# Patient Record
Sex: MALE | Race: WHITE | NOT HISPANIC OR LATINO | Employment: OTHER | URBAN - NONMETROPOLITAN AREA
[De-identification: names, ages, dates, MRNs, and addresses within clinical notes are randomized per-mention and may not be internally consistent; named-entity substitution may affect disease eponyms.]

---

## 2017-02-13 ENCOUNTER — OFFICE VISIT (OUTPATIENT)
Dept: CARDIOLOGY | Facility: CLINIC | Age: 82
End: 2017-02-13

## 2017-02-13 VITALS
BODY MASS INDEX: 35.31 KG/M2 | DIASTOLIC BLOOD PRESSURE: 62 MMHG | HEART RATE: 84 BPM | SYSTOLIC BLOOD PRESSURE: 112 MMHG | WEIGHT: 233 LBS | HEIGHT: 68 IN

## 2017-02-13 DIAGNOSIS — I10 ESSENTIAL HYPERTENSION: Primary | ICD-10-CM

## 2017-02-13 DIAGNOSIS — E78.00 HYPERCHOLESTEREMIA: ICD-10-CM

## 2017-02-13 DIAGNOSIS — I25.10 CORONARY ARTERY DISEASE INVOLVING NATIVE CORONARY ARTERY OF NATIVE HEART WITHOUT ANGINA PECTORIS: ICD-10-CM

## 2017-02-13 DIAGNOSIS — Z85.118 H/O: LUNG CANCER: ICD-10-CM

## 2017-02-13 DIAGNOSIS — R06.02 SHORTNESS OF BREATH: ICD-10-CM

## 2017-02-13 PROCEDURE — 99213 OFFICE O/P EST LOW 20 MIN: CPT | Performed by: NURSE PRACTITIONER

## 2017-02-14 PROBLEM — R06.02 SHORTNESS OF BREATH: Status: ACTIVE | Noted: 2017-02-14

## 2017-02-14 PROBLEM — I25.10 CORONARY ARTERY DISEASE INVOLVING NATIVE CORONARY ARTERY OF NATIVE HEART WITHOUT ANGINA PECTORIS: Status: ACTIVE | Noted: 2017-02-14

## 2017-02-14 PROBLEM — I10 ESSENTIAL HYPERTENSION: Status: ACTIVE | Noted: 2017-02-14

## 2017-02-14 PROBLEM — E78.00 HYPERCHOLESTEREMIA: Status: ACTIVE | Noted: 2017-02-14

## 2017-02-14 PROBLEM — Z85.118 H/O: LUNG CANCER: Status: ACTIVE | Noted: 2017-02-14

## 2017-09-14 ENCOUNTER — OFFICE VISIT (OUTPATIENT)
Dept: CARDIOLOGY | Facility: CLINIC | Age: 82
End: 2017-09-14

## 2017-09-14 VITALS
HEIGHT: 68 IN | BODY MASS INDEX: 34.86 KG/M2 | WEIGHT: 230 LBS | SYSTOLIC BLOOD PRESSURE: 140 MMHG | HEART RATE: 80 BPM | DIASTOLIC BLOOD PRESSURE: 76 MMHG

## 2017-09-14 DIAGNOSIS — I25.9 IHD (ISCHEMIC HEART DISEASE): ICD-10-CM

## 2017-09-14 DIAGNOSIS — E78.00 HYPERCHOLESTEREMIA: ICD-10-CM

## 2017-09-14 DIAGNOSIS — I51.7 CARDIOMEGALY: ICD-10-CM

## 2017-09-14 DIAGNOSIS — R06.02 SHORTNESS OF BREATH: ICD-10-CM

## 2017-09-14 DIAGNOSIS — Z85.118 H/O: LUNG CANCER: ICD-10-CM

## 2017-09-14 DIAGNOSIS — I10 ESSENTIAL HYPERTENSION: Primary | ICD-10-CM

## 2017-09-14 PROBLEM — I25.10 CORONARY ARTERY DISEASE INVOLVING NATIVE CORONARY ARTERY OF NATIVE HEART WITHOUT ANGINA PECTORIS: Status: RESOLVED | Noted: 2017-02-14 | Resolved: 2017-09-14

## 2017-09-14 PROCEDURE — 99213 OFFICE O/P EST LOW 20 MIN: CPT | Performed by: INTERNAL MEDICINE

## 2017-09-14 RX ORDER — CLOPIDOGREL BISULFATE 75 MG/1
75 TABLET ORAL DAILY
COMMUNITY
End: 2018-03-15 | Stop reason: ALTCHOICE

## 2017-09-14 RX ORDER — BENZONATATE 100 MG/1
100 CAPSULE ORAL 3 TIMES DAILY PRN
COMMUNITY

## 2017-09-14 NOTE — PROGRESS NOTES
"Chief Complaint   Patient presents with   • Follow-up     cardiac management, labs per PCP, patient brought medication list with visit.    • Shortness of Breath     \"all the time\" wears 02 at 2 liter's via nc, wears C-Pap at night, under the care of Dr. Sky   • Med Refill     [facility]       CARDIAC COMPLAINTS  dyspnea        Subjective   John Neal is a 82 y.o. male came in today for his follow-up visit.  He was diagnosed with ischemic heart disease in 2013 when he underwent stenting of the right coronary artery.  He also has history of pulmonary problem which apparently was diagnosed as idiopathic pulmonary fibrosis.  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  He is being followed by a pulmonologist.  He came today denies any chest pain, has significant shortness of breath.  Uses oxygen almost constantly.  His lab work is done at your office.  His CT scan was reported as having cardiomegaly.  His last cardiac workup done in 2013 did not show any LV dysfunction.            Cardiac History  Past Surgical History:   Procedure Laterality Date   • CARDIAC CATHETERIZATION  08/20/2013    Cath-70%RCA-4.0 x 20 Promus.   • CARDIOVASCULAR STRESS TEST  08/09/2013    Stress-(Mod Sy)-7min, 81%THR, 160/70. Inferolateral Ischemia.   • ECHO - CONVERTED  10/20/2008    Echo-([facility]. ) EF 65%. RVSP-33mmHg.   • ECHO - CONVERTED  08/09/2013    Echo-EF 55-60%, RVSP 28mmHg.   • OTHER SURGICAL HISTORY  04/27/2010    US Abdomen-No Aneurysm   • OTHER SURGICAL HISTORY  05/08/2013    US Thyroid-WNL   • OTHER SURGICAL HISTORY      05/06/13: CT Chest- Pulmonary Nodules. Calcification of Coronary Artery.    • US CAROTID UNILATERAL  10/20/2008    Carotid US-Normal       Current Outpatient Prescriptions   Medication Sig Dispense Refill   • albuterol (PROVENTIL) (2.5 MG/3ML) 0.083% nebulizer solution Take 2.5 mg by nebulization every 4 (four) hours as needed for " wheezing.     • amLODIPine (NORVASC) 10 MG tablet Take 10 mg by mouth daily.     • aspirin 81 MG EC tablet Take 81 mg by mouth daily.     • atorvastatin (LIPITOR) 40 MG tablet Take 40 mg by mouth every night.     • benzonatate (TESSALON) 100 MG capsule Take 100 mg by mouth 3 (Three) Times a Day As Needed for Cough.     • clopidogrel (PLAVIX) 75 MG tablet Take 75 mg by mouth Daily.     • ipratropium-albuterol (COMBIVENT RESPIMAT)  MCG/ACT inhaler Inhale 1 puff 4 (four) times a day as needed for wheezing.     • montelukast (SINGULAIR) 10 MG tablet Take 10 mg by mouth every night.     • ranitidine (ZANTAC) 150 MG tablet Take 1 tablet by mouth 2 (two) times a day. 180 tablet 3   • tamsulosin (FLOMAX) 0.4 MG capsule 24 hr capsule Take 1 capsule by mouth every night.     • tiotropium (SPIRIVA) 18 MCG per inhalation capsule Place 1 capsule into inhaler and inhale 1 (one) time daily.     • triamterene-hydrochlorothiazide (MAXZIDE-25) 37.5-25 MG per tablet Take 1 tablet by mouth daily.       No current facility-administered medications for this visit.        Allergies  :  Review of patient's allergies indicates no known allergies.       Past Medical History:   Diagnosis Date   • H/O CT scan of chest 05/06/2013    CT Chest-Pulmonary Nodules. Calcification of Coronary Artery.   • History of bronchoscopy     10/15: Bronchoscopy @ [facility].    • History of hernia surgery     x 2   • Hx of appendectomy    • Hypercholesterolemia    • Hypertension    • Previous back surgery    • Sleep apnea     wears C-Pap at night       Social History     Social History   • Marital status:      Spouse name: N/A   • Number of children: N/A   • Years of education: N/A     Occupational History   • Not on file.     Social History Main Topics   • Smoking status: Former Smoker     Quit date: 12/2012   • Smokeless tobacco: Never Used   • Alcohol use No   • Drug use: No   • Sexual activity: Not on file     Other Topics Concern   • Not  "on file     Social History Narrative       Family History   Problem Relation Age of Onset   • Prostate cancer Father        Review of Systems   Constitution: Positive for weakness and malaise/fatigue. Negative for decreased appetite.   HENT: Negative for congestion and sore throat.    Eyes: Negative for blurred vision.   Cardiovascular: Positive for dyspnea on exertion and leg swelling. Negative for chest pain and palpitations.   Respiratory: Positive for shortness of breath. Negative for snoring.    Endocrine: Negative for cold intolerance and heat intolerance.   Hematologic/Lymphatic: Negative for adenopathy. Does not bruise/bleed easily.   Skin: Negative for itching, nail changes and skin cancer.   Musculoskeletal: Positive for arthritis and myalgias.   Gastrointestinal: Negative for abdominal pain, dysphagia and heartburn.   Genitourinary: Negative for bladder incontinence and frequency.   Neurological: Negative for dizziness, light-headedness, seizures and vertigo.   Psychiatric/Behavioral: Negative for altered mental status.   Allergic/Immunologic: Negative for environmental allergies and hives.       Diabetes- No  Thyroid- normal    Objective     /76 (BP Location: Left arm)  Pulse 80  Ht 68\" (172.7 cm)  Wt 230 lb (104 kg)  BMI 34.97 kg/m2    Physical Exam   Constitutional: He is oriented to person, place, and time. He appears well-nourished.   HENT:   Head: Normocephalic.   Eyes: Pupils are equal, round, and reactive to light.   Neck: Normal range of motion.   Cardiovascular: Normal rate, regular rhythm, S1 normal and S2 normal.    Murmur heard.  Pulmonary/Chest: Effort normal and breath sounds normal.   Abdominal: Soft. Bowel sounds are normal.   Musculoskeletal: Normal range of motion. He exhibits edema.   Neurological: He is alert and oriented to person, place, and time.   Skin: Skin is warm.   Psychiatric: He has a normal mood and affect.     Procedures            Assessment/Plan     John was " seen today for follow-up, shortness of breath and med refill.    Diagnoses and all orders for this visit:    Essential hypertension    Hypercholesteremia    Shortness of breath  -     Adult Transthoracic Echo Complete; Future    H/O: lung cancer    IHD (ischemic heart disease)  -     Adult Transthoracic Echo Complete; Future    Cardiomegaly  -     Adult Transthoracic Echo Complete; Future       His heart rate and blood pressure appears to be stable at baseline.  Continue the same medications for now.  I explained to him about the CT scan.  I scheduled him to have a echocardiogram done to evaluate the LV function and also the LV volumes size.  If there is significant LV dysfunction or significant cardiomegaly, then he needs to undergo further workup.  Based on the results of these test, further recommendations will be made.                  Electronically signed by Yan Trejo MD September 14, 2017 4:54 PM

## 2017-09-20 ENCOUNTER — OUTSIDE FACILITY SERVICE (OUTPATIENT)
Dept: CARDIOLOGY | Facility: CLINIC | Age: 82
End: 2017-09-20

## 2017-09-20 ENCOUNTER — HOSPITAL ENCOUNTER (OUTPATIENT)
Dept: CARDIOLOGY | Facility: HOSPITAL | Age: 82
Discharge: HOME | End: 2017-09-20
Attending: INTERNAL MEDICINE | Admitting: INTERNAL MEDICINE

## 2017-09-20 DIAGNOSIS — R06.02 SHORTNESS OF BREATH: ICD-10-CM

## 2017-09-20 DIAGNOSIS — I25.9 IHD (ISCHEMIC HEART DISEASE): ICD-10-CM

## 2017-09-20 DIAGNOSIS — I51.7 CARDIOMEGALY: ICD-10-CM

## 2017-09-20 PROCEDURE — 93306 TTE W/DOPPLER COMPLETE: CPT

## 2017-09-20 PROCEDURE — 93306 TTE W/DOPPLER COMPLETE: CPT | Performed by: INTERNAL MEDICINE

## 2017-10-23 ENCOUNTER — TELEPHONE (OUTPATIENT)
Dept: CARDIOLOGY | Facility: CLINIC | Age: 82
End: 2017-10-23

## 2018-03-15 ENCOUNTER — OFFICE VISIT (OUTPATIENT)
Dept: CARDIOLOGY | Facility: CLINIC | Age: 83
End: 2018-03-15

## 2018-03-15 VITALS
DIASTOLIC BLOOD PRESSURE: 72 MMHG | HEART RATE: 92 BPM | SYSTOLIC BLOOD PRESSURE: 144 MMHG | BODY MASS INDEX: 35.77 KG/M2 | WEIGHT: 236 LBS | HEIGHT: 68 IN

## 2018-03-15 DIAGNOSIS — E78.00 HYPERCHOLESTEREMIA: ICD-10-CM

## 2018-03-15 DIAGNOSIS — I25.9 IHD (ISCHEMIC HEART DISEASE): Primary | ICD-10-CM

## 2018-03-15 DIAGNOSIS — J84.10 PULMONARY FIBROSIS (HCC): ICD-10-CM

## 2018-03-15 DIAGNOSIS — I10 ESSENTIAL HYPERTENSION: ICD-10-CM

## 2018-03-15 DIAGNOSIS — R06.02 SHORTNESS OF BREATH: ICD-10-CM

## 2018-03-15 PROCEDURE — 99213 OFFICE O/P EST LOW 20 MIN: CPT | Performed by: NURSE PRACTITIONER

## 2018-03-15 RX ORDER — ATORVASTATIN CALCIUM 80 MG/1
80 TABLET, FILM COATED ORAL DAILY
COMMUNITY

## 2018-03-15 RX ORDER — OMEPRAZOLE 20 MG/1
20 CAPSULE, DELAYED RELEASE ORAL DAILY
COMMUNITY

## 2018-03-15 RX ORDER — LANOLIN ALCOHOL/MO/W.PET/CERES
1000 CREAM (GRAM) TOPICAL DAILY
COMMUNITY
End: 2019-03-18

## 2018-03-15 NOTE — PROGRESS NOTES
Chief Complaint   Patient presents with   • Follow-up     For cardiac management. [facility] and he reports PCP has obtained in the last 6 months. [facility] ref[facility].   • Shortness of Breath     He reports about the same, wears O2 at 2 L/M [facility], follows with Dr Sky.       Subjective       John Neal is a 82 y.o. male with a history of hypertension, hyperlipidemia, IHD diagnosed in 2013 when he underwent stenting of the RCA.  He also has history of industrial exposure and developed pulmonary fibrosis on O2 followed by Dr. Sky.  Apparently his CT scan showed cardiomegaly and echo was repeated after last visit to evaluate the LV function and size.  Echocardiogram done on 9/20/17 showed normal LV function, EF 55-60%, mildly dilated right ventricle with PA pressure mildly elevated at 39 mmHg.  No AS or dilated aortic root.  His labs and refills have been [facility] by [facility] as well as primary care.  He apparently has AAA followed by a surgeon in [place] which now measures 6 cm according to his daughter, but he has declined to undergo any intervention.  He came in today for his follow up visit.  Shortness of breath remains the same.  He is wearing oxygen.  He denies any cardiac symptoms, no chest pain, no palpitations, no dizziness.  Blood pressure monitored regularly at home remains normal, less than 140/80.    HPI         Cardiac History:    Past Surgical History:   Procedure Laterality Date   • CARDIAC CATHETERIZATION  08/20/2013    Cath-70%RCA-4.0 x 20 Promus.   • CARDIOVASCULAR STRESS TEST  08/09/2013    Stress-(Mod Sy)-7min, 81%THR, 160/70. Inferolateral Ischemia.   • ECHO - CONVERTED  10/20/2008    Echo-([facility]. ) EF 65%. RVSP-33mmHg.   • ECHO - CONVERTED  08/09/2013    Echo-EF 55-60%, RVSP 28mmHg.   • ECHO - CONVERTED  09/20/2017    EF 55-60%, RV mildly dilated, RVSP 39 mmHg, no AS, mild MR    • OTHER SURGICAL HISTORY  04/27/2010    US Abdomen-No Aneurysm   • OTHER SURGICAL  HISTORY  05/08/2013    US Thyroid-WNL   • OTHER SURGICAL HISTORY      05/06/13: CT Chest- Pulmonary Nodules. Calcification of Coronary Artery.    • US CAROTID UNILATERAL  10/20/2008    Carotid US-Normal       Current Outpatient Prescriptions   Medication Sig Dispense Refill   • amLODIPine (NORVASC) 10 MG tablet Take 10 mg by mouth daily.     • aspirin 81 MG EC tablet Take 81 mg by mouth daily.     • atorvastatin (LIPITOR) 80 MG tablet Take 80 mg by mouth Daily.     • benzonatate (TESSALON) 100 MG capsule Take 100 mg by mouth 3 (Three) Times a Day As Needed for Cough.     • ipratropium-albuterol (COMBIVENT RESPIMAT)  MCG/ACT inhaler Inhale 1 puff 4 (four) times a day as needed for wheezing.     • montelukast (SINGULAIR) 10 MG tablet Take 10 mg by mouth every night.     • omeprazole (priLOSEC) 20 MG capsule Take 20 mg by mouth Daily.     • ranitidine (ZANTAC) 150 MG tablet Take 1 tablet by mouth 2 (two) times a day. 180 tablet 3   • tamsulosin (FLOMAX) 0.4 MG capsule 24 hr capsule Take 1 capsule by mouth every night.     • tiotropium (SPIRIVA) 18 MCG per inhalation capsule Place 1 capsule into inhaler and inhale 1 (one) time daily.     • triamterene-hydrochlorothiazide (MAXZIDE-25) 37.5-25 MG per tablet Take 1 tablet by mouth daily.     • vitamin B-12 (CYANOCOBALAMIN) 1000 MCG tablet Take 1,000 mcg by mouth Daily.       No current facility-administered medications for this visit.        Review of patient's allergies indicates no known allergies.    Past Medical History:   Diagnosis Date   • H/O CT scan of chest 05/06/2013    CT Chest-Pulmonary Nodules. Calcification of Coronary Artery.   • History of bronchoscopy     10/15: Bronchoscopy @ [facility].    • History of hernia surgery     x 2   • Hx of appendectomy    • Hypercholesterolemia    • Hypertension    • Previous back surgery    • Sleep apnea     wears C-Pap at night       Social History     Social History   • Marital status:      Spouse name:  "N/A   • Number of children: N/A   • Years of education: N/A     Occupational History   • Not on file.     Social History Main Topics   • Smoking status: Former Smoker     Quit date: 12/2012   • Smokeless tobacco: Never Used   • Alcohol use No   • Drug use: No   • Sexual activity: Not on file     Other Topics Concern   • Not on file     Social History Narrative   • No narrative on file       Family History   Problem Relation Age of Onset   • Prostate cancer Father        Review of Systems   Constitution: Positive for weakness, malaise/fatigue and weight gain (6 pounds). Negative for decreased appetite.   HENT: Negative.    Eyes: Negative.    Cardiovascular: Positive for dyspnea on exertion. Negative for chest pain, leg swelling, orthopnea, palpitations and syncope.   Respiratory: Positive for shortness of breath. Negative for cough and wheezing.    Endocrine: Negative.    Hematologic/Lymphatic: Negative.    Skin: Negative.    Musculoskeletal: Negative.    Gastrointestinal: Negative for abdominal pain and change in bowel habit.   Genitourinary: Negative for dysuria and hematuria.   Psychiatric/Behavioral: Negative.       Diabetes- No  Thyroid-normal, no labs     Objective     /72 (BP Location: Right arm)   Pulse 92   Ht 172.7 cm (67.99\")   Wt 107 kg (236 lb)   BMI 35.89 kg/m²     Physical Exam   Constitutional: He is oriented to person, place, and time. He appears well-developed and well-nourished.   HENT:   Head: Normocephalic.   Eyes: Pupils are equal, round, and reactive to light.   Neck: Normal range of motion.   Cardiovascular: Normal rate, regular rhythm and intact distal pulses.    No murmur heard.  Pulmonary/Chest: Effort normal. No respiratory distress. He has decreased breath sounds. He has no wheezes.   Abdominal: Soft. Bowel sounds are normal. He exhibits no distension.   Musculoskeletal: Normal range of motion. He exhibits no edema.   Neurological: He is alert and oriented to person, place, and " time.   Skin: Skin is warm and dry.   Psychiatric: He has a normal mood and affect.      Procedures          Assessment/Plan    Heart rate and blood pressure stable.  Continue to monitor at home with goal of <140/90.  I explained to him and his daughter about most recent echo which appears to be stable.  Continue current medications.  He is on aspirin, statin, diuretic, and CCB.  He remains asymptomatic from a cardiac perspective with no anginal pain.  He declined any further work up.  He plans to follow in [place] regarding aneurysm.  I advised him to avoid heavy lifting and maintain good blood pressure control.  Labs and refills will be [facility] with [facility] and [facility].  Body mass index is 35.89 kg/m².  This is elevated.  Heart healthy diet.  We will see him back in six months or sooner if any symptoms develop.        John was seen today for follow-up and shortness of breath.    Diagnoses and all orders for this visit:    IHD (ischemic heart disease)    Hypercholesteremia    Essential hypertension    Shortness of breath    Pulmonary fibrosis                    Electronically signed by RANDALL Powers,  March 15, 2018 5:35 PM

## 2018-09-18 ENCOUNTER — OFFICE VISIT (OUTPATIENT)
Dept: CARDIOLOGY | Facility: CLINIC | Age: 83
End: 2018-09-18

## 2018-09-18 VITALS
HEIGHT: 68 IN | WEIGHT: 234 LBS | SYSTOLIC BLOOD PRESSURE: 130 MMHG | BODY MASS INDEX: 35.46 KG/M2 | HEART RATE: 80 BPM | DIASTOLIC BLOOD PRESSURE: 78 MMHG

## 2018-09-18 DIAGNOSIS — E66.9 OBESITY (BMI 30-39.9): ICD-10-CM

## 2018-09-18 DIAGNOSIS — J84.10 PULMONARY FIBROSIS (HCC): ICD-10-CM

## 2018-09-18 DIAGNOSIS — Z99.81 ON HOME OXYGEN THERAPY: ICD-10-CM

## 2018-09-18 DIAGNOSIS — E78.00 HYPERCHOLESTEREMIA: ICD-10-CM

## 2018-09-18 DIAGNOSIS — R06.02 SHORTNESS OF BREATH: ICD-10-CM

## 2018-09-18 DIAGNOSIS — I10 ESSENTIAL HYPERTENSION: ICD-10-CM

## 2018-09-18 DIAGNOSIS — I20.8 ANGINAL EQUIVALENT (HCC): ICD-10-CM

## 2018-09-18 DIAGNOSIS — I25.9 IHD (ISCHEMIC HEART DISEASE): Primary | ICD-10-CM

## 2018-09-18 PROCEDURE — 99214 OFFICE O/P EST MOD 30 MIN: CPT | Performed by: NURSE PRACTITIONER

## 2018-09-18 NOTE — PROGRESS NOTES
Chief Complaint   Patient presents with   • Follow-up     For cardiac management. Patient is not on aspirin, reports that he stopped taking per himself. Reports that he has had shortness of breath even with wearing oxyen. Reports that he is to have [facility] work [facility] tomorrow [facility].    • Med Refill     Did not bring medication list.        Cardiac Complaints  dyspnea      Subjective   John Neal is a 83 y.o. male  with hypertension, hyperlipidemia, and IHD diagnosed in 2013 when he underwent stenting of the RCA.  He also has history of industrial exposure and developed pulmonary fibrosis being treated with O2 and followed by Dr. Sky.  Apparently his CT scan showed cardiomegaly and echo was repeated in 2017 to evaluate the LV function and size.  Echocardiogram done on 9/20/17 showed normal LV function, EF 55-60%, mildly dilated right ventricle with PA pressure mildly elevated at 39 mmHg.  No AS or dilated aortic root.  His labs and refills have been [facility] by [facility] as well as primary care.  He apparently has AAA followed by a surgeon in [place] which now measures 6 cm reported by his daughter, but he has declined to undergo any intervention.  He comes today for follow up and admits he is no longer taking ASA therapy.  When questioned as to why, he states he just quit it.  He does continue to have shortness of breath for which he wears oxygen 24/7.  He is not sure if it is getting worse, but does state it is not getting better.  L[facility] he reports [facility] with [facility] and will be checked again tomorrow.  No medication list is currently available for review.        Cardiac History  Past Surgical History:   Procedure Laterality Date   • CARDIAC CATHETERIZATION  08/20/2013    Cath-70%RCA-4.0 x 20 Promus.   • CARDIOVASCULAR STRESS TEST  08/09/2013    Stress-(Mod Sy)-7min, 81%THR, 160/70. Inferolateral Ischemia.   • ECHO - CONVERTED  10/20/2008    Echo-([facility]. ) EF 65%. RVSP-33mmHg.   • ECHO - CONVERTED   08/09/2013    Echo-EF 55-60%, RVSP 28mmHg.   • ECHO - CONVERTED  09/20/2017    EF 55-60%, RV mildly dilated, RVSP 39 mmHg, no AS, mild MR    • OTHER SURGICAL HISTORY  04/27/2010    US Abdomen-No Aneurysm   • OTHER SURGICAL HISTORY  05/08/2013    US Thyroid-WNL   • OTHER SURGICAL HISTORY      05/06/13: CT Chest- Pulmonary Nodules. Calcification of Coronary Artery.    • US CAROTID UNILATERAL  10/20/2008    Carotid US-Normal       Current Outpatient Prescriptions   Medication Sig Dispense Refill   • amLODIPine (NORVASC) 10 MG tablet Take 10 mg by mouth daily.     • atorvastatin (LIPITOR) 80 MG tablet Take 80 mg by mouth Daily.     • benzonatate (TESSALON) 100 MG capsule Take 100 mg by mouth 3 (Three) Times a Day As Needed for Cough.     • ipratropium-albuterol (COMBIVENT RESPIMAT)  MCG/ACT inhaler Inhale 1 puff 4 (four) times a day as needed for wheezing.     • montelukast (SINGULAIR) 10 MG tablet Take 10 mg by mouth every night.     • omeprazole (priLOSEC) 20 MG capsule Take 20 mg by mouth Daily.     • ranitidine (ZANTAC) 150 MG tablet Take 1 tablet by mouth 2 (two) times a day. 180 tablet 3   • tamsulosin (FLOMAX) 0.4 MG capsule 24 hr capsule Take 1 capsule by mouth every night.     • tiotropium (SPIRIVA) 18 MCG per inhalation capsule Place 1 capsule into inhaler and inhale 1 (one) time daily.     • triamterene-hydrochlorothiazide (MAXZIDE-25) 37.5-25 MG per tablet Take 1 tablet by mouth daily.     • vitamin B-12 (CYANOCOBALAMIN) 1000 MCG tablet Take 1,000 mcg by mouth Daily.       No current facility-administered medications for this visit.        Patient has no known allergies.    Past Medical History:   Diagnosis Date   • H/O CT scan of chest 05/06/2013    CT Chest-Pulmonary Nodules. Calcification of Coronary Artery.   • History of bronchoscopy     10/15: Bronchoscopy @ [facility].    • History of hernia surgery     x 2   • Hx of appendectomy    • Hypercholesterolemia    • Hypertension    • Previous  "back surgery    • Sleep apnea     wears C-Pap at night       Social History     Social History   • Marital status:      Spouse name: N/A   • Number of children: N/A   • Years of education: N/A     Occupational History   • Not on file.     Social History Main Topics   • Smoking status: Former Smoker     Quit date: 12/2012   • Smokeless tobacco: Never Used   • Alcohol use No   • Drug use: No   • Sexual activity: Not on file     Other Topics Concern   • Not on file     Social History Narrative   • No narrative on file       Family History   Problem Relation Age of Onset   • Prostate cancer Father        Review of Systems   Constitution: Negative for weakness and malaise/fatigue.   Cardiovascular: Positive for dyspnea on exertion. Negative for chest pain, irregular heartbeat, leg swelling, near-syncope, orthopnea, palpitations and syncope.   Respiratory: Positive for shortness of breath. Negative for cough and wheezing.    Musculoskeletal: Negative for back pain, joint pain and joint swelling.   Gastrointestinal: Negative for anorexia, heartburn, nausea and vomiting.   Genitourinary: Negative for dysuria, hematuria, hesitancy and nocturia.   Neurological: Negative for dizziness, light-headedness and loss of balance.   Psychiatric/Behavioral: Negative for depression and memory loss. The patient is not nervous/anxious.            Objective     /78 (BP Location: Right arm)   Pulse 80   Ht 172.7 cm (67.99\")   Wt 106 kg (234 lb)   BMI 35.59 kg/m²     Physical Exam   Constitutional: He is oriented to person, place, and time. He appears well-developed and well-nourished.   HENT:   Head: Normocephalic.   Eyes: Pupils are equal, round, and reactive to light. EOM are normal.   Neck: Normal range of motion. Neck supple.   Cardiovascular: Normal rate and regular rhythm.    Murmur heard.  Pulmonary/Chest: Effort normal.   Abdominal: Soft. Bowel sounds are normal.   Musculoskeletal: Normal range of motion. "   Neurological: He is alert and oriented to person, place, and time.   Skin: Skin is warm and dry.   Psychiatric: He has a normal mood and affect. His behavior is normal.       Procedures    Assessment/Plan     HR and BP are stable today.  No adjustment will be made for HTN management but patient urged on importance of strict blood pressure adherance for AAA management to maintain a blood pressure of less than 140/90.  Since shortness of breath has persisted and no stress has been done in 5 years since stenting, repeat workup with stress and echo will be advised to assess area, LV function, and RSVP.  More recommendations to follow. AAA remains managed by physicians in [place].  Labs including FLP for [facility]lipidemia [facility].  For pulmonary fibrosis, he continues to follow with Dr. Sky and continues with daily use of oxygen therapy.  BMI is elevated today at 234 but down 2 pounds from prior.  Good cardiac diet with limited sodium intake recommended.  6 month follow up advised or sooner if needed.       Problems Addressed this Visit        Cardiovascular and Mediastinum    Essential hypertension    Hypercholesteremia    IHD (ischemic heart disease) - Primary    Relevant Orders    Stress Test With Myocardial Perfusion One Day    Adult Transthoracic Echo Complete W/ Cont if Necessary Per Protocol       Respiratory    Shortness of breath    Relevant Orders    Stress Test With Myocardial Perfusion One Day    Adult Transthoracic Echo Complete W/ Cont if Necessary Per Protocol      Other Visit Diagnoses     Anginal equivalent (CMS/HCC)        Relevant Orders    Stress Test With Myocardial Perfusion One Day    Adult Transthoracic Echo Complete W/ Cont if Necessary Per Protocol    Pulmonary fibrosis (CMS/HCC)        On home oxygen therapy        Obesity (BMI 30-39.9)              Patient's Body mass index is 35.59 kg/m². BMI is above normal parameters. Recommendations include: nutrition  counseling.          Electronically signed by RANDALL Montalvo September 18, 2018 3:21 PM

## 2018-09-27 ENCOUNTER — APPOINTMENT (OUTPATIENT)
Dept: CARDIOLOGY | Facility: HOSPITAL | Age: 83
End: 2018-09-27

## 2018-10-02 ENCOUNTER — APPOINTMENT (OUTPATIENT)
Dept: CARDIOLOGY | Facility: HOSPITAL | Age: 83
End: 2018-10-02

## 2019-01-01 ENCOUNTER — OFFICE VISIT (OUTPATIENT)
Dept: CARDIOLOGY | Facility: CLINIC | Age: 84
End: 2019-01-01

## 2019-01-01 VITALS
HEART RATE: 90 BPM | WEIGHT: 237 LBS | DIASTOLIC BLOOD PRESSURE: 70 MMHG | BODY MASS INDEX: 33.93 KG/M2 | HEIGHT: 70 IN | SYSTOLIC BLOOD PRESSURE: 130 MMHG

## 2019-01-01 DIAGNOSIS — I49.1 PAC (PREMATURE ATRIAL CONTRACTION): ICD-10-CM

## 2019-01-01 DIAGNOSIS — I10 ESSENTIAL HYPERTENSION: ICD-10-CM

## 2019-01-01 DIAGNOSIS — E78.00 HYPERCHOLESTEREMIA: ICD-10-CM

## 2019-01-01 DIAGNOSIS — I25.9 IHD (ISCHEMIC HEART DISEASE): Primary | ICD-10-CM

## 2019-01-01 DIAGNOSIS — R00.2 PALPITATIONS: ICD-10-CM

## 2019-01-01 DIAGNOSIS — J84.10 PULMONARY FIBROSIS (HCC): ICD-10-CM

## 2019-01-01 PROCEDURE — 99213 OFFICE O/P EST LOW 20 MIN: CPT | Performed by: NURSE PRACTITIONER

## 2019-01-01 PROCEDURE — 93000 ELECTROCARDIOGRAM COMPLETE: CPT | Performed by: NURSE PRACTITIONER

## 2019-01-01 RX ORDER — BUDESONIDE AND FORMOTEROL FUMARATE DIHYDRATE 160; 4.5 UG/1; UG/1
2 AEROSOL RESPIRATORY (INHALATION)
COMMUNITY

## 2019-03-18 ENCOUNTER — OFFICE VISIT (OUTPATIENT)
Dept: CARDIOLOGY | Facility: CLINIC | Age: 84
End: 2019-03-18

## 2019-03-18 VITALS
HEART RATE: 83 BPM | OXYGEN SATURATION: 98 % | WEIGHT: 238.5 LBS | BODY MASS INDEX: 34.14 KG/M2 | SYSTOLIC BLOOD PRESSURE: 130 MMHG | HEIGHT: 70 IN | DIASTOLIC BLOOD PRESSURE: 80 MMHG

## 2019-03-18 DIAGNOSIS — I10 ESSENTIAL HYPERTENSION: ICD-10-CM

## 2019-03-18 DIAGNOSIS — E78.00 HYPERCHOLESTEREMIA: ICD-10-CM

## 2019-03-18 DIAGNOSIS — R06.02 SHORTNESS OF BREATH: ICD-10-CM

## 2019-03-18 DIAGNOSIS — I71.40 AAA (ABDOMINAL AORTIC ANEURYSM) WITHOUT RUPTURE (HCC): ICD-10-CM

## 2019-03-18 DIAGNOSIS — Z79.899 MEDICATION MANAGEMENT: ICD-10-CM

## 2019-03-18 DIAGNOSIS — I25.9 IHD (ISCHEMIC HEART DISEASE): Primary | ICD-10-CM

## 2019-03-18 PROCEDURE — 99213 OFFICE O/P EST LOW 20 MIN: CPT | Performed by: NURSE PRACTITIONER

## 2019-03-18 RX ORDER — PREDNISONE 1 MG/1
5 TABLET ORAL DAILY
COMMUNITY

## 2019-03-18 RX ORDER — LISINOPRIL 2.5 MG/1
2.5 TABLET ORAL DAILY
Qty: 30 TABLET | Refills: 2 | Status: SHIPPED | OUTPATIENT
Start: 2019-03-18 | End: 2020-01-01 | Stop reason: ALTCHOICE

## 2019-03-18 NOTE — PROGRESS NOTES
Chief Complaint   Patient presents with   • Follow-up     For cardiac management. Denies chest pain, palpitations. On home oxygen therapy. Last labs done on 9/25/18, patient brought results.   • Med Refill     [facility]       Subjective       John Neal is a 83 y.o. male with hypertension, hyperlipidemia, and IHD diagnosed in 2013 when he underwent stenting of the RCA.  He also has history of industrial exposure and developed pulmonary fibrosis being treated with O2 and followed by Dr. Sky.  Apparently his CT scan showed cardiomegaly and echo was repeated in 2017 to evaluate the LV function and size.  Echocardiogram done on 9/20/17 showed normal LV function, EF 55-60%, mildly dilated right ventricle with PA pressure mildly elevated at 39 mmHg.  No AS or dilated aortic root.  He apparently has AAA followed by a surgeon in [place] which surgery was advised, but patient declined to undergo any intervention. At his last office visit, repeat stress and echo were advised.  Apparently he became ill and canceled the testing.  He did not reschedule as he felt he did not have significant cardiac symptoms.    Today he comes to the office for a follow up visit.  He denies chest pain or palpitations.  He continues to have significant shortness of breath with minimal exertion.  His heart rate will increase with exertion but recovers back to baseline with rest.  No recent medication changes are noted.  [facility] TSH 1.44, B12 322, total cholesterol 138, triglycerides 162, HDL 41, LDL 64, vitamin D 23.8, BUN 18, creatinine 1.28, GFR 54, AST 23, ALT 26, microalbumin 339 area    HPI     Cardiac History:    Past Surgical History:   Procedure Laterality Date   • CARDIAC CATHETERIZATION  08/20/2013    Cath-70%RCA-4.0 x 20 Promus.   • CARDIOVASCULAR STRESS TEST  08/09/2013    Stress-(Mod Sy)-7min, 81%THR, 160/70. Inferolateral Ischemia.   • ECHO - CONVERTED  10/20/2008    Echo-([facility]. )  EF 65%. RVSP-33mmHg.   • ECHO - CONVERTED  08/09/2013    Echo-EF 55-60%, RVSP 28mmHg.   • ECHO - CONVERTED  09/20/2017    EF 55-60%, RV mildly dilated, RVSP 39 mmHg, no AS, mild MR    • OTHER SURGICAL HISTORY  04/27/2010    US Abdomen-No Aneurysm   • OTHER SURGICAL HISTORY  05/08/2013    US Thyroid-WNL   • OTHER SURGICAL HISTORY      05/06/13: CT Chest- Pulmonary Nodules. Calcification of Coronary Artery.    • US CAROTID UNILATERAL  10/20/2008    Carotid US-Normal       Current Outpatient Medications   Medication Sig Dispense Refill   • amLODIPine (NORVASC) 10 MG tablet Take 10 mg by mouth daily.     • atorvastatin (LIPITOR) 80 MG tablet Take 80 mg by mouth Daily.     • benzonatate (TESSALON) 100 MG capsule Take 100 mg by mouth 3 (Three) Times a Day As Needed for Cough.     • ipratropium-albuterol (COMBIVENT RESPIMAT)  MCG/ACT inhaler Inhale 1 puff 4 (four) times a day as needed for wheezing.     • montelukast (SINGULAIR) 10 MG tablet Take 10 mg by mouth every night.     • omeprazole (priLOSEC) 20 MG capsule Take 20 mg by mouth Daily.     • predniSONE (DELTASONE) 5 MG tablet Take 5 mg by mouth Daily.     • ranitidine (ZANTAC) 150 MG tablet Take 1 tablet by mouth 2 (two) times a day. 180 tablet 3   • tamsulosin (FLOMAX) 0.4 MG capsule 24 hr capsule Take 1 capsule by mouth every night.     • tiotropium (SPIRIVA) 18 MCG per inhalation capsule Place 1 capsule into inhaler and inhale 1 (one) time daily.     • triamterene-hydrochlorothiazide (MAXZIDE-25) 37.5-25 MG per tablet Take 1 tablet by mouth daily.     • lisinopril (PRINIVIL,ZESTRIL) 2.5 MG tablet Take 1 tablet by mouth Daily. 30 tablet 2     No current facility-administered medications for this visit.        Patient has no known allergies.    Past Medical History:   Diagnosis Date   • H/O CT scan of chest 05/06/2013    CT Chest-Pulmonary Nodules. Calcification of Coronary Artery.   • History of bronchoscopy     10/15: Bronchoscopy @ [facility].    •  History of hernia surgery     x 2   • Hx of appendectomy    • Hypercholesterolemia    • Hypertension    • Previous back surgery    • Sleep apnea     wears C-Pap at night       Social History     Socioeconomic History   • Marital status:      Spouse name: Not on file   • Number of children: Not on file   • Years of education: Not on file   • Highest education level: Not on file   Social Needs   • Financial resource strain: Not on file   • Food insecurity - worry: Not on file   • Food insecurity - inability: Not on file   • Transportation needs - medical: Not on file   • Transportation needs - non-medical: Not on file   Occupational History   • Not on file   Tobacco Use   • Smoking status: Former Smoker     Last attempt to quit: 2012     Years since quittin.2   • Smokeless tobacco: Never Used   Substance and Sexual Activity   • Alcohol use: No   • Drug use: No   • Sexual activity: Not on file   Other Topics Concern   • Not on file   Social History Narrative   • Not on file       Family History   Problem Relation Age of Onset   • Prostate cancer Father        Review of Systems   Constitution: Positive for malaise/fatigue (same). Negative for decreased appetite.   HENT: Negative for congestion, hoarse voice, nosebleeds and sore throat.    Eyes: Negative for blurred vision and redness.   Cardiovascular: Negative for chest pain, leg swelling, near-syncope and palpitations.   Respiratory: Positive for cough, shortness of breath and sleep disturbances due to breathing. Negative for sputum production.    Endocrine: Negative for cold intolerance and heat intolerance.   Hematologic/Lymphatic: Negative for bleeding problem. Does not bruise/bleed easily.   Skin: Negative for color change, dry skin and itching.   Musculoskeletal: Positive for joint pain. Negative for back pain and muscle cramps.   Gastrointestinal: Negative for abdominal pain, change in bowel habit, dysphagia, heartburn, melena and nausea.  "  Genitourinary: Negative for dysuria and hematuria.   Neurological: Negative for dizziness and light-headedness.   Psychiatric/Behavioral: Negative for altered mental status and memory loss. The patient does not have insomnia.    Allergic/Immunologic: Negative for hives.      Objective     /80 (BP Location: Left arm)   Pulse 83   Ht 177.8 cm (70\")   Wt 108 kg (238 lb 8 oz)   SpO2 98%   BMI 34.22 kg/m²     Physical Exam   Constitutional: He is oriented to person, place, and time. He appears well-nourished.   HENT:   Head: Normocephalic.   Eyes: Pupils are equal, round, and reactive to light.   Neck: Normal range of motion. Neck supple. Carotid bruit is not present.   Cardiovascular: Normal rate, regular rhythm, S1 normal and S2 normal.   No murmur heard.  Pulses:       Radial pulses are 2+ on the right side, and 2+ on the left side.   Slightly loud S2   Pulmonary/Chest: Effort normal and breath sounds normal. He has no rales.   Abdominal: Soft. Bowel sounds are normal. He exhibits no distension. There is no tenderness.   Musculoskeletal: Normal range of motion. He exhibits no edema or tenderness.   Neurological: He is alert and oriented to person, place, and time.   Skin: Skin is warm and dry.   Psychiatric: He has a normal mood and affect. His behavior is normal.      Procedures: none today        Assessment/Plan      John was seen today for follow-up and med refill.    Diagnoses and all orders for this visit:    IHD (ischemic heart disease)    Essential hypertension    Hypercholesteremia    Shortness of breath    AAA (abdominal aortic aneurysm) without rupture (CMS/HCC)    Medication management    Other orders  -     lisinopril (PRINIVIL,ZESTRIL) 2.5 MG tablet; Take 1 tablet by mouth Daily.    His blood pressure is in the upper normal range.  On September lab report, microalbumin significantly elevated.  Potassium is normal.  Creatinine is slightly increased.  We will try adding los dose ACE " inhibitor for renal protection, BP control and he was instructed on importance to continue to monitor labs for medication management.  At this time the same dose of Maxide and Norvasc advised.    Patient's Body mass index is 34.22 kg/m². BMI is above normal parameters. Recommendations include: nutrition counseling. DASH diet information given to him.      He has history of significant AAA, which he declines surgical intervention or monitoring at this time.     His lipids are controlled liver function tests are normal with current statin therapy.  Advised to continue Lipitor 80 daily.    He continues to use daily oxygen with benefit.    From a cardiac standpoint, he denies cardiac concerns or symptoms and does not want to reschedule any testing at this time. If any issues develop, he understands to call and repeat cardiac workup can be ordered. A 6-month follow-up visit scheduled.  Please call sooner for any cardiac concerns.           Electronically signed by RANDALL Parikh,  March 18, 2019 12:01 PM

## 2019-03-18 NOTE — PATIENT INSTRUCTIONS
"DASH Eating Plan  DASH stands for \"Dietary Approaches to Stop Hypertension.\" The DASH eating plan is a healthy eating plan that has been shown to reduce high blood pressure (hypertension). It may also reduce your risk for type 2 diabetes, heart disease, and stroke. The DASH eating plan may also help with weight loss.  What are tips for following this plan?  General guidelines  · Avoid eating more than 2,300 mg (milligrams) of salt (sodium) a day. If you have hypertension, you may need to reduce your sodium intake to 1,500 mg a day.  · Limit alcohol intake to no more than 1 drink a day for nonpregnant women and 2 drinks a day for men. One drink equals 12 oz of beer, 5 oz of wine, or 1½ oz of hard liquor.  · Work with your health care provider to maintain a healthy body weight or to lose weight. Ask what an ideal weight is for you.  · Get at least 30 minutes of exercise that causes your heart to beat faster (aerobic exercise) most days of the week. Activities may include walking, swimming, or biking.  · Work with your health care provider or diet and nutrition specialist (dietitian) to adjust your eating plan to your individual calorie needs.  Reading food labels  · Check food labels for the amount of sodium per serving. Choose foods with less than 5 percent of the Daily Value of sodium. Generally, foods with less than 300 mg of sodium per serving fit into this eating plan.  · To find whole grains, look for the word \"whole\" as the first word in the ingredient list.  Shopping  · Buy products labeled as \"low-sodium\" or \"no salt added.\"  · Buy fresh foods. Avoid canned foods and premade or frozen meals.  Cooking  · Avoid adding salt when cooking. Use salt-free seasonings or herbs instead of table salt or sea salt. Check with your health care provider or pharmacist before using salt substitutes.  · Do not sy foods. Cook foods using healthy methods such as baking, boiling, grilling, and broiling instead.  · Cook with " heart-healthy oils, such as olive, canola, soybean, or sunflower oil.  Meal planning    · Eat a balanced diet that includes:  ? 5 or more servings of fruits and vegetables each day. At each meal, try to fill half of your plate with fruits and vegetables.  ? Up to 6-8 servings of whole grains each day.  ? Less than 6 oz of lean meat, poultry, or fish each day. A 3-oz serving of meat is about the same size as a deck of cards. One egg equals 1 oz.  ? 2 servings of low-fat dairy each day.  ? A serving of nuts, seeds, or beans 5 times each week.  ? Heart-healthy fats. Healthy fats called Omega-3 fatty acids are found in foods such as flaxseeds and coldwater fish, like sardines, salmon, and mackerel.  · Limit how much you eat of the following:  ? Canned or prepackaged foods.  ? Food that is high in trans fat, such as fried foods.  ? Food that is high in saturated fat, such as fatty meat.  ? Sweets, desserts, sugary drinks, and other foods with added sugar.  ? Full-fat dairy products.  · Do not salt foods before eating.  · Try to eat at least 2 vegetarian meals each week.  · Eat more home-cooked food and less restaurant, buffet, and fast food.  · When eating at a restaurant, ask that your food be prepared with less salt or no salt, if possible.  What foods are recommended?  The items listed may not be a complete list. Talk with your dietitian about what dietary choices are best for you.  Grains  Whole-grain or whole-wheat bread. Whole-grain or whole-wheat pasta. Brown rice. Oatmeal. Quinoa. Bulgur. Whole-grain and low-sodium cereals. Gail bread. Low-fat, low-sodium crackers. Whole-wheat flour tortillas.  Vegetables  Fresh or frozen vegetables (raw, steamed, roasted, or grilled). Low-sodium or reduced-sodium tomato and vegetable juice. Low-sodium or reduced-sodium tomato sauce and tomato paste. Low-sodium or reduced-sodium canned vegetables.  Fruits  All fresh, dried, or frozen fruit. Canned fruit in natural juice (without  added sugar).  Meat and other protein foods  Skinless chicken or turkey. Ground chicken or turkey. Pork with fat trimmed off. Fish and seafood. Egg whites. Dried beans, peas, or lentils. Unsalted nuts, nut butters, and seeds. Unsalted canned beans. Lean cuts of beef with fat trimmed off. Low-sodium, lean deli meat.  Dairy  Low-fat (1%) or fat-free (skim) milk. Fat-free, low-fat, or reduced-fat cheeses. Nonfat, low-sodium ricotta or cottage cheese. Low-fat or nonfat yogurt. Low-fat, low-sodium cheese.  Fats and oils  Soft margarine without trans fats. Vegetable oil. Low-fat, reduced-fat, or light mayonnaise and salad dressings (reduced-sodium). Canola, safflower, olive, soybean, and sunflower oils. Avocado.  Seasoning and other foods  Herbs. Spices. Seasoning mixes without salt. Unsalted popcorn and pretzels. Fat-free sweets.  What foods are not recommended?  The items listed may not be a complete list. Talk with your dietitian about what dietary choices are best for you.  Grains  Baked goods made with fat, such as croissants, muffins, or some breads. Dry pasta or rice meal packs.  Vegetables  Creamed or fried vegetables. Vegetables in a cheese sauce. Regular canned vegetables (not low-sodium or reduced-sodium). Regular canned tomato sauce and paste (not low-sodium or reduced-sodium). Regular tomato and vegetable juice (not low-sodium or reduced-sodium). Pickles. Olives.  Fruits  Canned fruit in a light or heavy syrup. Fried fruit. Fruit in cream or butter sauce.  Meat and other protein foods  Fatty cuts of meat. Ribs. Fried meat. Davila. Sausage. Bologna and other processed lunch meats. Salami. Fatback. Hotdogs. Bratwurst. Salted nuts and seeds. Canned beans with added salt. Canned or smoked fish. Whole eggs or egg yolks. Chicken or turkey with skin.  Dairy  Whole or 2% milk, cream, and half-and-half. Whole or full-fat cream cheese. Whole-fat or sweetened yogurt. Full-fat cheese. Nondairy creamers. Whipped toppings.  Processed cheese and cheese spreads.  Fats and oils  Butter. Stick margarine. Lard. Shortening. Ghee. Davila fat. Tropical oils, such as coconut, palm kernel, or palm oil.  Seasoning and other foods  Salted popcorn and pretzels. Onion salt, garlic salt, seasoned salt, table salt, and sea salt. Worcestershire sauce. Tartar sauce. Barbecue sauce. Teriyaki sauce. Soy sauce, including reduced-sodium. Steak sauce. Canned and packaged gravies. Fish sauce. Oyster sauce. Cocktail sauce. Horseradish that you find on the shelf. Ketchup. Mustard. Meat flavorings and tenderizers. Bouillon cubes. Hot sauce and Tabasco sauce. Premade or packaged marinades. Premade or packaged taco seasonings. Relishes. Regular salad dressings.  Where to find more information:  · National Heart, Lung, and [employer] [place]: www.nhlbi.nih.gov  · American Heart Association: www.heart.org  Summary  · The DASH eating plan is a healthy eating plan that has been shown to reduce high blood pressure (hypertension). It may also reduce your risk for type 2 diabetes, heart disease, and stroke.  · With the DASH eating plan, you should limit salt (sodium) intake to 2,300 mg a day. If you have hypertension, you may need to reduce your sodium intake to 1,500 mg a day.  · When on the DASH eating plan, aim to eat more fresh fruits and vegetables, whole grains, lean proteins, low-fat dairy, and heart-healthy fats.  · Work with your health care provider or diet and nutrition specialist (dietitian) to adjust your eating plan to your individual calorie needs.  This information is not intended to replace advice given to you by your health care provider. Make sure you discuss any questions you have with your health care provider.  Document Released: 12/06/2012 Document Revised: 12/11/2017 Document Reviewed: 12/11/2017  [employer] Patient Education © 2019 [employer]

## 2019-08-27 NOTE — PROGRESS NOTES
Chief Complaint   Patient presents with   • Follow-up     Cardiac management . Has c/o muscle /leg cramps . [facility] with [facility] per patient.    • Shortness of Breath     with minimal exertion .  Is worse today than ususal   • Med Refill     Reviewed medications verbally with patient and wife . No refills needed today - [facility]       Subjective       John Neal is a 84 y.o. male  with hypertension, hyperlipidemia, and IHD diagnosed in 2013 when he underwent stenting of the RCA.  He also has history of industrial exposure and developed pulmonary fibrosis being treated with O2 and followed by Dr. Sky. He was later diagnosed and treated for lung cancer. In 2017, echocardiogram was done to evaluate the LV function and size. LVEF of 55-60% and mildly dilated right ventricle with PA pressure at 39 mmHg, no AS or dilated aortic root was noted.  He apparently has AAA followed by a surgeon in [place] which surgery was advised, but patient declined to undergo any intervention. In 2018, repeat stress and echo were advised.  Apparently he became ill and canceled the testing.  He did not reschedule as he felt he did not have significant cardiac symptoms.    Today he comes to the office for a follow up visit.  He does have increased heart rate and palpitations with exertion that resolves at rest.  No significant chest pain reported.  His shortness of breath has progressively gotten worse and he is now using supplemental oxygen at 4 L.    HPI     Cardiac History:    Past Surgical History:   Procedure Laterality Date   • CARDIAC CATHETERIZATION  08/20/2013    Cath-70%RCA-4.0 x 20 Promus.   • CARDIOVASCULAR STRESS TEST  08/09/2013    Stress-(Mod Sy)-7min, 81%THR, 160/70. Inferolateral Ischemia.   • ECHO - CONVERTED  10/20/2008    Echo-([facility]. ) EF 65%. RVSP-33mmHg.   • ECHO - CONVERTED  08/09/2013    Echo-EF 55-60%, RVSP 28mmHg.   • ECHO - CONVERTED  09/20/2017    EF 55-60%, RV mildly dilated,  RVSP 39 mmHg, no AS, mild MR    • OTHER SURGICAL HISTORY  04/27/2010    US Abdomen-No Aneurysm   • OTHER SURGICAL HISTORY  05/08/2013    US Thyroid-WNL   • OTHER SURGICAL HISTORY      05/06/13: CT Chest- Pulmonary Nodules. Calcification of Coronary Artery.    • US CAROTID UNILATERAL  10/20/2008    Carotid US-Normal       Current Outpatient Medications   Medication Sig Dispense Refill   • amLODIPine (NORVASC) 10 MG tablet Take 10 mg by mouth daily.     • atorvastatin (LIPITOR) 80 MG tablet Take 80 mg by mouth Daily. Takes 1/2 tablet nightly     • benzonatate (TESSALON) 100 MG capsule Take 100 mg by mouth 3 (Three) Times a Day As Needed for Cough.     • budesonide-formoterol (SYMBICORT) 160-4.5 MCG/ACT inhaler Inhale 2 puffs 2 (Two) Times a Day.     • montelukast (SINGULAIR) 10 MG tablet Take 10 mg by mouth every night.     • predniSONE (DELTASONE) 5 MG tablet Take 5 mg by mouth Daily.     • ranitidine (ZANTAC) 150 MG tablet Take 1 tablet by mouth 2 (two) times a day. 180 tablet 3   • tamsulosin (FLOMAX) 0.4 MG capsule 24 hr capsule Take 1 capsule by mouth every night.     • tiotropium (SPIRIVA) 18 MCG per inhalation capsule Place 1 capsule into inhaler and inhale 1 (one) time daily.     • triamterene-hydrochlorothiazide (MAXZIDE-25) 37.5-25 MG per tablet Take 1 tablet by mouth daily.     • lisinopril (PRINIVIL,ZESTRIL) 2.5 MG tablet Take 1 tablet by mouth Daily. 30 tablet 2   • omeprazole (priLOSEC) 20 MG capsule Take 20 mg by mouth Daily.       No current facility-administered medications for this visit.        Patient has no known allergies.    Past Medical History:   Diagnosis Date   • H/O CT scan of chest 05/06/2013    CT Chest-Pulmonary Nodules. Calcification of Coronary Artery.   • History of bronchoscopy     10/15: Bronchoscopy @ [facility].    • History of hernia surgery     x 2   • Hx of appendectomy    • Hypercholesterolemia    • Hypertension    • Previous back surgery    • Sleep apnea     wears C-Pap  "at night       Social History     Socioeconomic History   • Marital status:      Spouse name: Not on file   • Number of children: Not on file   • Years of education: Not on file   • Highest education level: Not on file   Tobacco Use   • Smoking status: Former Smoker     Last attempt to quit: 2012     Years since quittin.7   • Smokeless tobacco: Never Used   Substance and Sexual Activity   • Alcohol use: No   • Drug use: No       Family History   Problem Relation Age of Onset   • Prostate cancer Father        Review of Systems   Constitution: Positive for weakness and malaise/fatigue. Negative for decreased appetite.   HENT: Negative for congestion, nosebleeds and sore throat.    Cardiovascular: Positive for dyspnea on exertion and palpitations. Negative for chest pain, leg swelling and near-syncope.   Respiratory: Positive for sleep disturbances due to breathing (uses CPAP with oxygen ).    Endocrine: Negative for polydipsia, polyphagia and polyuria.   Hematologic/Lymphatic: Negative for bleeding problem. Does not bruise/bleed easily.   Skin: Negative.    Musculoskeletal: Positive for muscle weakness. Negative for falls and muscle cramps.   Gastrointestinal: Negative for abdominal pain, dysphagia and melena.   Genitourinary: Negative for dysuria and hematuria.   Neurological: Positive for excessive daytime sleepiness and light-headedness. Negative for difficulty with concentration.   Psychiatric/Behavioral: Positive for depression. Negative for altered mental status, memory loss and suicidal ideas. The patient is nervous/anxious.    Allergic/Immunologic: Negative for hives and persistent infections.        Objective       [facility]: TSH 1.44, B12 322, total cholesterol 138, triglycerides 162, HDL 41, LDL 64, vitamin D 23.8, BUN 18, creatinine 1.28, GFR 54, AST 23, ALT 26, microalbumin 339 area    /70 (BP Location: Left arm)   Pulse 90   Ht 177.8 cm (70\")   Wt 108 kg (237 lb) " "  BMI 34.01 kg/m²     Physical Exam   Constitutional: He is oriented to person, place, and time. He appears well-nourished.   HENT:   Head: Normocephalic.   Eyes: Pupils are equal, round, and reactive to light.   Neck: Normal range of motion. Carotid bruit is not present.   Cardiovascular: Normal rate, regular rhythm and S1 normal.   No murmur heard.  Pulses:       Radial pulses are 2+ on the right side, and 2+ on the left side.   Loud S2   Pulmonary/Chest: He has decreased breath sounds. He has no wheezes. He has no rales.   Abdominal: Soft. Bowel sounds are normal.   Musculoskeletal: Normal range of motion. He exhibits no edema.   Neurological: He is alert and oriented to person, place, and time.   Skin: Skin is warm.   Psychiatric: He has a normal mood and affect.          ECG 12 Lead  Date/Time: 8/28/2019 11:23 AM  Performed by: Chelsea Seay APRN  Authorized by: Chelsea Seay APRN   Comparison: compared with previous ECG from 9/10/2015  Similar to previous ECG  Comparison to previous ECG: NSR, 69 bpm, inferior infarct  Rhythm: sinus rhythm  Ectopy: atrial premature contractions  BPM: 90                  Assessment/Plan      John was seen today for follow-up, shortness of breath and med refill.    Diagnoses and all orders for this visit:    IHD (ischemic heart disease)    Palpitations  -     ECG 12 Lead    Essential hypertension    Hypercholesteremia    Pulmonary fibrosis (CMS/HCC)    PAC (premature atrial contraction)  -     ECG 12 Lead       IHD/palpitations- chest pain denied.  He admits to palpitations especially \"heart pounding\" with walking.  Cardiac monitor was advised but he declines at this time.  EKG was done and shows normal sinus rhythm with a PAC noted.  We did discussed changing Norvasc to Cardizem.  At this time he wants to continue current medications.  If palpitations worsen he is agrees to call and consider cardiac monitor and/or medication change.     HTN-blood pressure today is " stable.  He will continue Maxide, lisinopril, and Norvasc.  No refills needed at this time.    Hypercholesterolemia- on Lipitor 80 mg daily without issues.  Lipid panel last year showed lipids well controlled and liver function test normal.  [facility] or [facility]. He agrees to bring copy of results next visit.     Pulmonary fibrosis- he is following with Dr. Shea.  He is compliant with supplemental oxygen.    Patient's Body mass index is 34.01 kg/m². BMI is above normal parameters. Recommendations include: nutrition counseling.  His weight is unchanged.  I encouraged him on diet for weight loss.     A 6-month follow-up visit scheduled.  Please call sooner for any cardiac concerns.           Electronically signed by RANDALL Parikh,  August 28, 2019 11:34 AM

## 2019-08-28 PROBLEM — J84.10 PULMONARY FIBROSIS (HCC): Status: ACTIVE | Noted: 2019-01-01

## 2020-01-01 ENCOUNTER — OUTSIDE FACILITY SERVICE (OUTPATIENT)
Dept: CARDIOLOGY | Facility: CLINIC | Age: 85
End: 2020-01-01

## 2020-01-01 ENCOUNTER — OFFICE VISIT (OUTPATIENT)
Dept: CARDIOLOGY | Facility: CLINIC | Age: 85
End: 2020-01-01

## 2020-01-01 VITALS
WEIGHT: 232 LBS | SYSTOLIC BLOOD PRESSURE: 140 MMHG | HEART RATE: 84 BPM | BODY MASS INDEX: 33.21 KG/M2 | DIASTOLIC BLOOD PRESSURE: 68 MMHG | HEIGHT: 70 IN

## 2020-01-01 DIAGNOSIS — I10 ESSENTIAL HYPERTENSION: ICD-10-CM

## 2020-01-01 DIAGNOSIS — I25.9 IHD (ISCHEMIC HEART DISEASE): ICD-10-CM

## 2020-01-01 DIAGNOSIS — I48.19 PERSISTENT ATRIAL FIBRILLATION (HCC): ICD-10-CM

## 2020-01-01 DIAGNOSIS — J84.10 PULMONARY FIBROSIS (HCC): ICD-10-CM

## 2020-01-01 DIAGNOSIS — R06.02 SHORTNESS OF BREATH: ICD-10-CM

## 2020-01-01 DIAGNOSIS — E78.00 HYPERCHOLESTEREMIA: ICD-10-CM

## 2020-01-01 DIAGNOSIS — I71.40 AAA (ABDOMINAL AORTIC ANEURYSM) WITHOUT RUPTURE (HCC): ICD-10-CM

## 2020-01-01 PROCEDURE — 93000 ELECTROCARDIOGRAM COMPLETE: CPT | Performed by: NURSE PRACTITIONER

## 2020-01-01 PROCEDURE — 93306 TTE W/DOPPLER COMPLETE: CPT | Performed by: INTERNAL MEDICINE

## 2020-01-01 PROCEDURE — 99232 SBSQ HOSP IP/OBS MODERATE 35: CPT | Performed by: INTERNAL MEDICINE

## 2020-01-01 PROCEDURE — 99214 OFFICE O/P EST MOD 30 MIN: CPT | Performed by: NURSE PRACTITIONER

## 2020-01-01 PROCEDURE — 99223 1ST HOSP IP/OBS HIGH 75: CPT | Performed by: INTERNAL MEDICINE

## 2020-01-01 RX ORDER — DILTIAZEM HYDROCHLORIDE 240 MG/1
240 CAPSULE, COATED, EXTENDED RELEASE ORAL DAILY
COMMUNITY

## 2020-02-25 NOTE — PROGRESS NOTES
Chief Complaint   Patient presents with   • Follow-up     Hospital Follow up  with diagnosis Respiratory Failure , new onset paroxysmal  Afib . Patient has no cardiac complaints since hospital stay.    • Med Refill     Needs prescription  [facility].  Is no longer taking Lisinopril.   • Shortness of Breath     And chest with walking, is now on 6 L/NC continous  and Bipap at night.       Subjective       John Neal is a 84 y.o. male with hypertension, hyperlipidemia, and IHD diagnosed in 2013 when he underwent stenting of the RCA.  He also has history of industrial exposure and developed pulmonary fibrosis being treated with O2 and followed by Dr. Sky. He was later diagnosed and treated for lung cancer. In 2017, echocardiogram was done to evaluate the LV function and size. LVEF of 55-60% and mildly dilated right ventricle with PA pressure at 39 mmHg, no AS or dilated aortic root was noted.  He apparently has AAA followed by a surgeon in [place] which surgery was advised, but patient declined to undergo any intervention. In 2018, repeat stress and echo were advised.  Apparently he became ill and canceled the testing.  He did not reschedule as he felt he did not have significant cardiac symptoms.    At his last visit he did admit to increased shortness of breath and supplemental oxygen had been increased to 4 L.    On 2/11/2020 he went to emergency department with significant increase in shortness of breath and O2 saturation decreased to 73%.  He was treated for respiratory problems including pulmonary edema and chronic hypoxic respiratory failure, to be followed by Dr. Batista.  He was found to have cardiomegaly and Dr. Trejo consulted.  Echocardiogram was technically limited but showed LVH with EF around 55%, diastolic dysfunction, enlarged left atrium and aortic root.  Significant pulmonary hypertension noted.  He was also diagnosed with new onset of PAF.  Renal dosing Eliquis was  initially advised but discontinued due to increasing AAA.  CT of abdomen/pelvis showed 7.3 cm AAA and 4.1 cm infrarenal aneurysm.  Cardizem was added for rate control and blood pressure management.    Today he comes to the office for a follow-up visit.  He is using a wheelchair but does continue to walk down his hallways at home.  With this activity he reports increased shortness of breath and decrease in SPO2.  Supplemental oxygen is now at 6 L per NC.  He is unaware of irregular heart beats.  No significant chest pain noted.  No swelling of his lower extremities noted.    HPI     Cardiac History:    Past Surgical History:   Procedure Laterality Date   • CARDIAC CATHETERIZATION  08/20/2013    Cath-70%RCA-4.0 x 20 Promus.   • CARDIOVASCULAR STRESS TEST  08/09/2013    Stress-(Mod Sy)-7min, 81%THR, 160/70. Inferolateral Ischemia.   • ECHO - CONVERTED  10/20/2008    Echo-([facility]. ) EF 65%. RVSP-33mmHg.   • ECHO - CONVERTED  08/09/2013    Echo-EF 55-60%, RVSP 28mmHg.   • ECHO - CONVERTED  09/20/2017    EF 55-60%, RV mildly dilated, RVSP 39 mmHg, no AS, mild MR    • ECHO - CONVERTED  02/11/2020    [facility]- TLS- EF 55%, diastolic dysfunction, enlarged left atrium and aortic root, significant PHT   • OTHER SURGICAL HISTORY  04/27/2010    US Abdomen-No Aneurysm   • OTHER SURGICAL HISTORY  05/08/2013    US Thyroid-WNL   • OTHER SURGICAL HISTORY      05/06/13: CT Chest- Pulmonary Nodules. Calcification of Coronary Artery.    • US CAROTID UNILATERAL  10/20/2008    Carotid US-Normal       Current Outpatient Medications   Medication Sig Dispense Refill   • atorvastatin (LIPITOR) 80 MG tablet Take 80 mg by mouth Daily. Takes 1/2 tablet nightly     • benzonatate (TESSALON) 100 MG capsule Take 100 mg by mouth 3 (Three) Times a Day As Needed for Cough.     • dilTIAZem CD (CARDIZEM CD) 240 MG 24 hr capsule Take 240 mg by mouth Daily.     • montelukast (SINGULAIR) 10 MG tablet Take 10 mg by mouth every night.     • omeprazole  (priLOSEC) 20 MG capsule Take 20 mg by mouth Daily.     • predniSONE (DELTASONE) 5 MG tablet Take 5 mg by mouth Daily.     • tamsulosin (FLOMAX) 0.4 MG capsule 24 hr capsule Take 1 capsule by mouth every night.     • tiotropium (SPIRIVA) 18 MCG per inhalation capsule Place 1 capsule into inhaler and inhale 1 (one) time daily.     • triamterene-hydrochlorothiazide (MAXZIDE-25) 37.5-25 MG per tablet Take 1 tablet by mouth daily.     • budesonide-formoterol (SYMBICORT) 160-4.5 MCG/ACT inhaler Inhale 2 puffs 2 (Two) Times a Day.       No current facility-administered medications for this visit.        Patient has no known allergies.    Past Medical History:   Diagnosis Date   • H/O CT scan of chest 2013    CT Chest-Pulmonary Nodules. Calcification of Coronary Artery.   • History of bronchoscopy     10/15: Bronchoscopy @ [facility].    • History of hernia surgery     x 2   • Hx of appendectomy    • Hypercholesterolemia    • Hypertension    • Previous back surgery    • Sleep apnea     wears C-Pap at night       Social History     Socioeconomic History   • Marital status:      Spouse name: Not on file   • Number of children: Not on file   • Years of education: Not on file   • Highest education level: Not on file   Tobacco Use   • Smoking status: Former Smoker     Last attempt to quit: 2012     Years since quittin.2   • Smokeless tobacco: Never Used   Substance and Sexual Activity   • Alcohol use: No   • Drug use: No       Family History   Problem Relation Age of Onset   • Prostate cancer Father        Review of Systems   Constitution: Positive for malaise/fatigue. Negative for decreased appetite, diaphoresis and fever.   HENT: Negative for congestion, hoarse voice and nosebleeds.    Eyes: Negative for redness and visual disturbance.   Cardiovascular: Positive for dyspnea on exertion, irregular heartbeat and palpitations. Negative for chest pain, leg swelling and near-syncope.   Respiratory:  "Positive for cough, shortness of breath and sleep disturbances due to breathing.    Endocrine: Negative for polydipsia, polyphagia and polyuria.   Skin: Negative for dry skin, flushing and itching.   Musculoskeletal: Positive for muscle weakness. Negative for falls and muscle cramps.   Gastrointestinal: Negative for abdominal pain and heartburn.   Genitourinary: Negative for dysuria and hematuria.   Neurological: Positive for loss of balance and weakness. Negative for difficulty with concentration.   Psychiatric/Behavioral: Negative for altered mental status and memory loss. The patient is not nervous/anxious.    Allergic/Immunologic: Negative for hives.        Objective      [facility]/2019 [facility]: Total cholesterol 137, triglycerides 118, HDL 43, LDL 70, sodium 136, potassium 3.6, chloride 99, CO2 30, glucose 96, BUN 20, creatinine 1.27, GFR 54, calcium 9.3, total protein 7.3, albumin 3.7, total bili 0.5, AST 24, ALT 21, ALP 66     9/21/2018 [facility] report from [facility]: TSH 1.44, B12 322, total cholesterol 138, triglycerides 162, HDL 41, LDL 64, vitamin D 23.8, BUN 18, creatinine 1.28, GFR 54, AST 23, ALT 26, microalbumin 339 area    /68 (BP Location: Left arm)   Pulse 84   Ht 177.8 cm (70\")   Wt 105 kg (232 lb)   BMI 33.29 kg/m²     Physical Exam   Constitutional: He is oriented to person, place, and time. Vital signs are normal. He appears well-nourished. He does not appear ill.   HENT:   Head: Normocephalic.   Eyes: Pupils are equal, round, and reactive to light.   Neck: Normal range of motion. JVD present.   Cardiovascular: Normal rate, regular rhythm and S1 normal.   Murmur heard.  Pulses:       Radial pulses are 2+ on the right side, and 2+ on the left side.   Loud S2   Pulmonary/Chest: He has decreased breath sounds.   Abdominal: Soft. Bowel sounds are normal.   Musculoskeletal: Normal range of motion. He exhibits no edema or tenderness.   Neurological: He is alert and oriented to person, place, and time.   "   Skin: Skin is warm and dry.   Psychiatric: He has a normal mood and affect. His behavior is normal.          ECG 12 Lead  Date/Time: 2/26/2020 7:41 PM  Performed by: Chelsea Seay APRN  Authorized by: Chelsea Seay APRN   Comparison: compared with previous ECG from 8/28/2019  Comparison to previous ECG: Sinus with PACs  Rhythm: atrial fibrillation  BPM: 84                  Assessment/Plan      John was seen today for follow-up, med refill and shortness of breath.    Diagnoses and all orders for this visit:    Persistent atrial fibrillation    IHD (ischemic heart disease)    Pulmonary fibrosis (CMS/HCC)    Shortness of breath    Essential hypertension    Hypercholesteremia    AAA (abdominal aortic aneurysm) without rupture (CMS/HCC)    Other orders  -     ECG 12 Lead      The reports of recent echocardiogram and CT scans reviewed.  He has significant AAA therefore not a candidate for Eliquis therapy.  His EKG today shows atrial fibrillation but with heart rate well controlled.  Advised to continue same dose Cardizem. Prescription printed and signed so [facility] is [facility].     Patient's Body mass index is 33.29 kg/m². BMI is above normal parameters. Recommendations include: nutrition counseling. DASH Diet encouraged.    For hypercholesterolemia he continues Lipitor without issue.  [facility].    For shortness of breath he is using supplemental oxygen at 6 L per NC.  He is following with pulmonologist and has appointment in the near future.  He was advised on use of wheelchair in home to avoid episodes of significant shortness of breath and oxygen desaturation.     A 6-month follow-up visit scheduled.  Please call sooner for any cardiac concerns.             Electronically signed by RANDALL Parikh,  February 26, 2020 7:46 PM

## 2024-08-29 NOTE — PROGRESS NOTES
Chief Complaint   Patient presents with   • Follow-up     Denies chest pain or palpitations. Didn't bring med list. Labs per [facility].    • Shortness of Breath     About the same as before. Only wears O2 when he is out. Only using 1L. Still following with Dr. Sky.        Subjective       John Neal is a 81 y.o. male with a history of hypertension, hypercholesterolemia, and COPD who had a lot of symptoms shortness of breath.  He underwent a stress test that showed ischemia.  Cardiac catheterization confirmed a lesion in his RCA and underwent stenting.  In October 2015, he was diagnosed with lung cancer and underwent radiation treatment.  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  At his last office visit he denied cardiac symptoms.  Today he returns to the office for a follow-up appointment. He will being following up with vascular surgeon at  regarding aortic aneurysm and possible surgery if next scan shows increase. Currently, he denies cardiac symptoms. Shortness of breath is unchanged and wears daily oxygen monitored by Dr. Batista.     HPI         Cardiac History:    Past Surgical History   Procedure Laterality Date   • Back surgery     • Appendectomy     • Hernia repair       X2   • Echo - converted  10/20/2008     Echo-([facility]. ) EF 65%. RVSP-33mmHg.   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   • Echo - converted  08/09/2013     Echo-EF 55-60%, RVSP 28mmHg.   • Cardiovascular stress test  08/09/2013     Stress-(Mod Sy)-7min, 81%THR, 160/70. Inferolateral Ischemia.   • Cath lab procedure  08/20/2013     Cath-70%RCA-4.0 x 20 Promus.   • Other surgical history       05/06/13: CT Chest- Pulmonary Nodules. Calcification of Coronary  Artery.        Current Outpatient Prescriptions   Medication Sig Dispense Refill   • albuterol (PROVENTIL) (2.5 MG/3ML) 0.083% nebulizer solution Take 2.5 mg by nebulization every 4 (four) hours as needed for wheezing.     • amLODIPine (NORVASC) 10 MG tablet Take 10 mg by mouth daily.     • aspirin 81 MG EC tablet Take 81 mg by mouth daily.     • atorvastatin (LIPITOR) 40 MG tablet Take 40 mg by mouth every night.     • ipratropium-albuterol (COMBIVENT RESPIMAT)  MCG/ACT inhaler Inhale 1 puff 4 (four) times a day as needed for wheezing.     • montelukast (SINGULAIR) 10 MG tablet Take 10 mg by mouth every night.     • ranitidine (ZANTAC) 150 MG tablet Take 1 tablet by mouth 2 (two) times a day. 180 tablet 3   • tamsulosin (FLOMAX) 0.4 MG capsule 24 hr capsule Take 1 capsule by mouth every night.     • tiotropium (SPIRIVA) 18 MCG per inhalation capsule Place 1 capsule into inhaler and inhale 1 (one) time daily.     • triamterene-hydrochlorothiazide (MAXZIDE-25) 37.5-25 MG per tablet Take 1 tablet by mouth daily.       No current facility-administered medications for this visit.        Review of patient's allergies indicates no known allergies.    Past Medical History   Diagnosis Date   • H/O CT scan of chest 05/06/2013     CT Chest-Pulmonary Nodules. Calcification of Coronary Artery.   • History of bronchoscopy      10/15: Bronchoscopy @ [facility].    • History of hernia surgery      x 2   • Hx of appendectomy    • Hypercholesterolemia    • Hypertension    • Previous back surgery    • Sleep apnea        Social History     Social History   • Marital status:      Spouse name: N/A   • Number of children: N/A   • Years of education: N/A     Occupational History   • Not on file.     Social History Main Topics   • Smoking status: Former Smoker     Quit date: 12/2012   • Smokeless tobacco: Never Used   • Alcohol use No   • Drug use: No   • Sexual activity: Not on file     Other Topics Concern   • Not on  "file     Social History Narrative       Family History   Problem Relation Age of Onset   • Prostate cancer Father        Review of Systems   Constitutional: Positive for fatigue. Negative for activity change and appetite change.   HENT: Negative.    Respiratory: Positive for shortness of breath. Negative for cough and choking.    Cardiovascular: Positive for leg swelling (mild at times). Negative for chest pain and palpitations.   Gastrointestinal: Negative.    Genitourinary: Negative.    Musculoskeletal: Negative for gait problem and myalgias.   Neurological: Negative for syncope, facial asymmetry, speech difficulty, light-headedness and headaches.   Hematological: Negative.    Psychiatric/Behavioral: Negative.        Diabetes- No  Thyroid-normal    Objective     Visit Vitals   • /62   • Pulse 84   • Ht 68\" (172.7 cm)   • Wt 233 lb (106 kg)   • BMI 35.43 kg/m2       Physical Exam   Constitutional: He is oriented to person, place, and time. Vital signs are normal.   Eyes: Pupils are equal, round, and reactive to light.   Neck: Neck supple. No JVD present. Carotid bruit is not present.   Cardiovascular: Normal rate, regular rhythm and S1 normal.    Pulses:       Radial pulses are 2+ on the right side, and 2+ on the left side.   Loud S2   Pulmonary/Chest: Effort normal and breath sounds normal.   Abdominal: Soft. Bowel sounds are normal.   Neurological: He is alert and oriented to person, place, and time.   Skin: Skin is warm and dry.   Psychiatric: He has a normal mood and affect. His behavior is normal. Judgment and thought content normal.   Vitals reviewed.    Procedures          Assessment/Plan      John was seen today for follow-up and shortness of breath.    Diagnoses and all orders for this visit:    Essential hypertension    Hypercholesteremia    Coronary artery disease involving native coronary artery of native heart without angina pectoris    H/O: lung cancer    Shortness of breath      His weight " has increased but does not have edema. Advised him to monitor weight and for swelling. I encouraged him on decreased caloric intake. He follows with vascular surgeon at  for monitoring of thoracic and abdominal aortic aneurysms. He follows with pulmonology for management of lung issues. From a cardiac standpoint he appears stable. No changes to cardiac medication recommended today. He reports he will have cardiac workup at  should he have to have aneurysm repair. No cardiac testing ordered today.   A 6 month follow up scheduled. Please call sooner for problems.            Electronically signed by RANDALL Parikh,  February 14, 2017 4:09 PM   Other